# Patient Record
Sex: MALE | Race: BLACK OR AFRICAN AMERICAN | NOT HISPANIC OR LATINO | Employment: UNEMPLOYED | URBAN - METROPOLITAN AREA
[De-identification: names, ages, dates, MRNs, and addresses within clinical notes are randomized per-mention and may not be internally consistent; named-entity substitution may affect disease eponyms.]

---

## 2021-04-25 ENCOUNTER — HOSPITAL ENCOUNTER (EMERGENCY)
Age: 31
Discharge: LEFT BEFORE DISCHARGE | End: 2021-04-25
Attending: EMERGENCY MEDICINE

## 2021-04-25 ENCOUNTER — APPOINTMENT (OUTPATIENT)
Dept: CT IMAGING | Age: 31
End: 2021-04-25
Attending: EMERGENCY MEDICINE

## 2021-04-25 VITALS
RESPIRATION RATE: 25 BRPM | TEMPERATURE: 97.7 F | HEIGHT: 67 IN | OXYGEN SATURATION: 98 % | SYSTOLIC BLOOD PRESSURE: 135 MMHG | WEIGHT: 149 LBS | BODY MASS INDEX: 23.39 KG/M2 | DIASTOLIC BLOOD PRESSURE: 91 MMHG | HEART RATE: 77 BPM

## 2021-04-25 DIAGNOSIS — R10.9 ABDOMINAL PAIN, UNSPECIFIED ABDOMINAL LOCATION: Primary | ICD-10-CM

## 2021-04-25 LAB
ALBUMIN SERPL-MCNC: 3.8 G/DL (ref 3.6–5.1)
ALBUMIN/GLOB SERPL: 0.8 {RATIO} (ref 1–2.4)
ALP SERPL-CCNC: 81 UNITS/L (ref 45–117)
ALT SERPL-CCNC: 23 UNITS/L
ANION GAP SERPL CALC-SCNC: 14 MMOL/L (ref 10–20)
AST SERPL-CCNC: 19 UNITS/L
BASOPHILS # BLD: 0 K/MCL (ref 0–0.3)
BASOPHILS NFR BLD: 1 %
BILIRUB SERPL-MCNC: 0.3 MG/DL (ref 0.2–1)
BUN SERPL-MCNC: 15 MG/DL (ref 6–20)
BUN/CREAT SERPL: 14 (ref 7–25)
CALCIUM SERPL-MCNC: 8.9 MG/DL (ref 8.4–10.2)
CHLORIDE SERPL-SCNC: 101 MMOL/L (ref 98–107)
CO2 SERPL-SCNC: 25 MMOL/L (ref 21–32)
CREAT SERPL-MCNC: 1.07 MG/DL (ref 0.67–1.17)
DEPRECATED RDW RBC: 35.8 FL (ref 39–50)
EOSINOPHIL # BLD: 0.1 K/MCL (ref 0–0.5)
EOSINOPHIL NFR BLD: 1 %
ERYTHROCYTE [DISTWIDTH] IN BLOOD: 11.8 % (ref 11–15)
FASTING DURATION TIME PATIENT: ABNORMAL H
GFR SERPLBLD BASED ON 1.73 SQ M-ARVRAT: >90 ML/MIN/1.73M2
GLOBULIN SER-MCNC: 4.8 G/DL (ref 2–4)
GLUCOSE SERPL-MCNC: 83 MG/DL (ref 65–99)
HCT VFR BLD CALC: 50.5 % (ref 39–51)
HGB BLD-MCNC: 16.9 G/DL (ref 13–17)
IMM GRANULOCYTES # BLD AUTO: 0.1 K/MCL (ref 0–0.2)
IMM GRANULOCYTES # BLD: 1 %
LIPASE SERPL-CCNC: 52 UNITS/L (ref 73–393)
LYMPHOCYTES # BLD: 1.2 K/MCL (ref 1–4.8)
LYMPHOCYTES NFR BLD: 22 %
MCH RBC QN AUTO: 28.2 PG (ref 26–34)
MCHC RBC AUTO-ENTMCNC: 33.5 G/DL (ref 32–36.5)
MCV RBC AUTO: 84.3 FL (ref 78–100)
MONOCYTES # BLD: 0.7 K/MCL (ref 0.3–0.9)
MONOCYTES NFR BLD: 13 %
NEUTROPHILS # BLD: 3.5 K/MCL (ref 1.8–7.7)
NEUTROPHILS NFR BLD: 62 %
NRBC BLD MANUAL-RTO: 0 /100 WBC
PLATELET # BLD AUTO: 344 K/MCL (ref 140–450)
POTASSIUM SERPL-SCNC: 3.7 MMOL/L (ref 3.4–5.1)
PROT SERPL-MCNC: 8.6 G/DL (ref 6.4–8.2)
RBC # BLD: 5.99 MIL/MCL (ref 4.5–5.9)
SODIUM SERPL-SCNC: 136 MMOL/L (ref 135–145)
WBC # BLD: 5.6 K/MCL (ref 4.2–11)

## 2021-04-25 PROCEDURE — 10002807 HB RX 258: Performed by: EMERGENCY MEDICINE

## 2021-04-25 PROCEDURE — 10002805 HB CONTRAST AGENT: Performed by: EMERGENCY MEDICINE

## 2021-04-25 PROCEDURE — 96375 TX/PRO/DX INJ NEW DRUG ADDON: CPT

## 2021-04-25 PROCEDURE — 99285 EMERGENCY DEPT VISIT HI MDM: CPT | Performed by: EMERGENCY MEDICINE

## 2021-04-25 PROCEDURE — 10002801 HB RX 250 W/O HCPCS: Performed by: EMERGENCY MEDICINE

## 2021-04-25 PROCEDURE — 96374 THER/PROPH/DIAG INJ IV PUSH: CPT

## 2021-04-25 PROCEDURE — 96361 HYDRATE IV INFUSION ADD-ON: CPT

## 2021-04-25 PROCEDURE — 80053 COMPREHEN METABOLIC PANEL: CPT | Performed by: NURSE PRACTITIONER

## 2021-04-25 PROCEDURE — 10002800 HB RX 250 W HCPCS: Performed by: EMERGENCY MEDICINE

## 2021-04-25 PROCEDURE — 99285 EMERGENCY DEPT VISIT HI MDM: CPT | Performed by: NURSE PRACTITIONER

## 2021-04-25 PROCEDURE — 99284 EMERGENCY DEPT VISIT MOD MDM: CPT

## 2021-04-25 PROCEDURE — 85025 COMPLETE CBC W/AUTO DIFF WBC: CPT | Performed by: NURSE PRACTITIONER

## 2021-04-25 PROCEDURE — 83690 ASSAY OF LIPASE: CPT | Performed by: NURSE PRACTITIONER

## 2021-04-25 PROCEDURE — 74177 CT ABD & PELVIS W/CONTRAST: CPT

## 2021-04-25 RX ORDER — ONDANSETRON 2 MG/ML
4 INJECTION INTRAMUSCULAR; INTRAVENOUS ONCE
Status: COMPLETED | OUTPATIENT
Start: 2021-04-25 | End: 2021-04-25

## 2021-04-25 RX ORDER — FAMOTIDINE 10 MG/ML
20 INJECTION, SOLUTION INTRAVENOUS ONCE
Status: COMPLETED | OUTPATIENT
Start: 2021-04-25 | End: 2021-04-25

## 2021-04-25 RX ADMIN — Medication 15 ML: at 11:15

## 2021-04-25 RX ADMIN — SODIUM CHLORIDE 1000 ML: 9 INJECTION, SOLUTION INTRAVENOUS at 11:13

## 2021-04-25 RX ADMIN — ONDANSETRON 4 MG: 2 INJECTION INTRAMUSCULAR; INTRAVENOUS at 11:16

## 2021-04-25 RX ADMIN — DIATRIZOATE MEGLUMINE AND DIATRIZOATE SODIUM 30 ML: 660; 100 LIQUID ORAL; RECTAL at 10:55

## 2021-04-25 RX ADMIN — IOHEXOL 75 ML: 300 INJECTION, SOLUTION INTRAVENOUS at 12:34

## 2021-04-25 RX ADMIN — FAMOTIDINE 20 MG: 10 INJECTION INTRAVENOUS at 11:15

## 2021-04-25 SDOH — HEALTH STABILITY: MENTAL HEALTH: HOW OFTEN DO YOU HAVE A DRINK CONTAINING ALCOHOL?: NEVER

## 2021-04-25 ASSESSMENT — ENCOUNTER SYMPTOMS
SHORTNESS OF BREATH: 0
WHEEZING: 0
DIZZINESS: 0
DIARRHEA: 1
DIAPHORESIS: 0
EYE PAIN: 0
HEADACHES: 0
EYE REDNESS: 0
ABDOMINAL PAIN: 1
VOMITING: 0
NAUSEA: 1
FEVER: 0
EYE DISCHARGE: 0
COUGH: 0
CHILLS: 0
CONSTIPATION: 0
SORE THROAT: 0

## 2021-04-25 ASSESSMENT — PAIN DESCRIPTION - PAIN TYPE: TYPE: ACUTE PAIN

## 2021-04-25 ASSESSMENT — PAIN SCALES - GENERAL: PAINLEVEL_OUTOF10: 6

## 2023-02-08 ENCOUNTER — EMERGENCY (EMERGENCY)
Facility: HOSPITAL | Age: 33
LOS: 1 days | Discharge: HOME | End: 2023-02-08
Admitting: EMERGENCY MEDICINE
Payer: MEDICAID

## 2023-02-08 VITALS
WEIGHT: 160.06 LBS | OXYGEN SATURATION: 100 % | DIASTOLIC BLOOD PRESSURE: 82 MMHG | HEIGHT: 67 IN | HEART RATE: 103 BPM | SYSTOLIC BLOOD PRESSURE: 125 MMHG | RESPIRATION RATE: 18 BRPM | TEMPERATURE: 98 F

## 2023-02-08 DIAGNOSIS — A53.9 SYPHILIS, UNSPECIFIED: ICD-10-CM

## 2023-02-08 DIAGNOSIS — Z86.19 PERSONAL HISTORY OF OTHER INFECTIOUS AND PARASITIC DISEASES: ICD-10-CM

## 2023-02-08 PROCEDURE — 99284 EMERGENCY DEPT VISIT MOD MDM: CPT

## 2023-02-08 RX ORDER — PENICILLIN G BENZATHINE 1200000 [IU]/2ML
2.4 INJECTION, SUSPENSION INTRAMUSCULAR ONCE
Refills: 0 | Status: COMPLETED | OUTPATIENT
Start: 2023-02-08 | End: 2023-02-08

## 2023-02-08 RX ADMIN — PENICILLIN G BENZATHINE 2.4 MILLION UNIT(S): 1200000 INJECTION, SUSPENSION INTRAMUSCULAR at 20:14

## 2023-02-08 NOTE — ED PROVIDER NOTE - PHYSICAL EXAMINATION
CONSTITUTIONAL   General appearance: looks well, no acute distress, alert, interactive, pleasant, answers questions appropriately    EYES   RIGHT eye: Normal Conjunctiva and Lid   LEFT eye: Normal Conjunctiva and Lid   Sclerae: NO subconjunctival hemorrhage, No scleral icterus   PUPILS: PERRL   EOM: EOMI     EAR / NOSE / THROAT   Moist mucous membranes      NECK   Supple, no meningismus, trachea midline, full ROM     CARDIOVASCULAR   Well perfused      LUNGS   Respiratory effort: No respiratory distress, normal respiration effort, speaking in Full Sentences     ABDOMEN   No distention      SKIN   Normal color, NO erythema, NO bruising, NO skin lesion   PALPABLE RAISED LESIONS ON BILATERAL SOLES THAT ARE SKIN COLORED, SCATTERED    PSYCHIATRIC   Judgement and insight intact, normal mood and affect, patient at baseline MS     NEUROLOGIC    Alert and oriented x 3, Speech normal, No focal deficits, normal motor and sensory, MAEx4

## 2023-02-08 NOTE — ED ADULT NURSE NOTE - NSIMPLEMENTINTERV_GEN_ALL_ED
Implemented All Universal Safety Interventions:  [place] to call system. Call bell, personal items and telephone within reach. Instruct patient to call for assistance. Room bathroom lighting operational. Non-slip footwear when patient is off stretcher. Physically safe environment: no spills, clutter or unnecessary equipment. Stretcher in lowest position, wheels locked, appropriate side rails in place. Implemented All Universal Safety Interventions:  [place] to call system. Call bell, personal items and telephone within reach. Instruct patient to call for assistance. Room bathroom lighting operational. Non-slip footwear when patient is off stretcher. Physically safe environment: no spills, clutter or unnecessary equipment. Stretcher in lowest position, wheels locked, appropriate side rails in place. Implemented All Universal Safety Interventions:  [place] to call system. Call bell, personal items and telephone within reach. Instruct patient to call for assistance. Room bathroom lighting operational. Non-slip footwear when patient is off stretcher. Physically safe environment: no spills, clutter or unnecessary equipment. Stretcher in lowest position, wheels locked, appropriate side rails in place.

## 2023-02-08 NOTE — ED PROVIDER NOTE - OBJECTIVE STATEMENT
31 yo M patient here for tx of syphilis. 2 months ago, [facility], labs all neg except for elevated titers of syphilis, at this time, he was given 1 shot of IM PCN but did not return and was advised that he needed 3 weekly shots of PCN for treatment. He is here today to restart treatment.     Of note, pt states he develop raised rashes on bilateral soles of feet. No other sx. 33 yo M patient here for tx of syphilis. 2 months ago, had STI testing [facility], labs all neg except for elevated titers of syphilis, at this time, he was given 1 shot of IM PCN but did not return and was advised that he needed 3 weekly shots of PCN for treatment. He is here today to restart treatment.     Of note, pt states he develop raised rashes on bilateral soles of feet. No other sx.

## 2023-02-08 NOTE — ED PROVIDER NOTE - NSFOLLOWUPINSTRUCTIONS_ED_ALL_ED_FT
Return for the next 2 weeks for repeat penicillin shots.      Syphilis      Syphilis is an infection that can spread through sexual contact. The infection can cause serious complications, so it is important to get treatment right away.    There are four stages of syphilis:  •Primary stage. During this stage sores may form where the disease entered your body.      •Secondary stage. During this stage skin rashes and lesions will form.      •Latent stage. During this stage there are no symptoms, but the infection may still be contagious.      •Tertiary stage. This stage happens 10–30 years after the infection starts. During this stage, the disease damages organs and can lead to death. Most people do not develop this stage of syphilis.        What are the causes?    This condition is caused by bacteria called Treponema pallidum. The condition can spread during sexual activity, such as during oral, anal, or vaginal sex. It can also be spread from mother to fetus during pregnancy.      What increases the risk?    You are more likely to develop this condition if:  •You do not use a condom.      •You have or had another sexually transmitted infection (STI).      •You have multiple sex partners.      •You use illegal drugs through an IV.        What are the signs or symptoms?    Symptoms of this condition depend on the stage of the disease.    Primary stage     •Painless sores (chancres) in and around the genital organs, mouth, or hands. The sores are usually firm and round.      Secondary stage     •A rash or sores. The rash usually does not itch.      •A fever.      •A headache.      •A sore throat.      •Swollen lymph nodes.      •New sores in the mouth or on the genitals.      •A feeling of being ill.      •Pain in the joints.      •Patchy hair loss.      •Weight loss.      •Fatigue.      Latent stage     There are no symptoms during this stage.    Tertiary stage     •Dementia.      •Personality and mood changes.      •Difficulty walking and coordinating movements.      •Muscle weakness or paralysis.      •Problems with coordination.      •Heart failure.      •Trouble breathing.      •Fainting.      •Soft, rubbery growths on the skin, bones, or liver (gummas).      •Sudden "lightning" pains, numbness, or tingling.      •Vision changes.      •Hearing changes.      •Trouble controlling your urine and bowel movements.        How is this diagnosed?     This condition is diagnosed with:  •A physical exam.      •Blood tests.      •Tests of the fluid (drainage) from a sore or rash.      •Tests of the fluid around the spine (lumbar puncture). These tests are done to check for an infection in the brain or nervous system.    •Imaging tests. These may be done to check for damage to the heart, aorta, or brain if the condition is in the tertiary stage. Tests may include:  •An X-ray.      •A CT scan.      •An MRI.      •An echocardiogram. This test takes a picture of the heart.      •An ultrasound.          How is this treated?    This condition can be cured with antibiotic medicine. During the first day of treatment, the medicine may cause you to experience fever, chills, headache, nausea, or aching all over your body. This is normal and should go away within 24 hours.      Follow these instructions at home:      Medicines      •Take over-the-counter and prescription medicines only as told by your health care provider.      •Take your antibiotic medicine as told by your health care provider. Do not stop taking the antibiotic even if you start to feel better. Incomplete treatment will put you at risk for continued infection and could be life threatening.      General instructions     • Do not have sex until your treatment is completed, or as directed by your health care provider.      •Tell your recent sexual partners that you were diagnosed with syphilis. It is important that they get treatment, even if they do not have symptoms.      •Keep all follow-up visits as told by your health care provider. This is important.        How is this prevented?    •Use latex condoms correctly whenever you have sex.      •Before you have sex, ask your partner if he or she has been tested for STIs. Ask about the test results.      •Avoid having multiple sexual partners.        Contact a health care provider if:  •You continue to have any of the following symptoms 24 hours after beginning treatment:  •Fever.      •Chills.      •Headache.      •Nausea.      •Aching all over your body.        •Your symptoms do not improve, even with treatment.        Get help right away if:    •You have severe chest pain.      •You have trouble walking or coordinating movements.      •You are confused.      •You lose vision or hearing.      •You have numbness in your arms or legs.      •You have a seizure.      •You faint.      •You have a severe headache that does not go away with medicine.        Summary    •Syphilis is an infection that can spread through sexual contact or from mother to fetus during pregnancy.      •This condition can cause serious complications, so it is best to get treatment right away. The condition can be cured with antibiotic medicine.      •Take your antibiotic medicine as told by your health care provider.      •Tell your recent sexual partners that you were diagnosed with syphilis. It is important that they get treatment, even if they do not have symptoms.

## 2023-02-08 NOTE — ED ADULT NURSE NOTE - OBJECTIVE STATEMENT
Patient presents to the ED requesting penicillin injection (recently tested for syphilis and was receiving treatment). Also c/o bumps to the bottom of his feet. denies fever, chills, nausea, vomiting, dysuria, CP, SOB.

## 2023-03-22 ENCOUNTER — EMERGENCY (EMERGENCY)
Facility: HOSPITAL | Age: 33
LOS: 1 days | Discharge: HOME | End: 2023-03-22
Attending: EMERGENCY MEDICINE | Admitting: EMERGENCY MEDICINE
Payer: MEDICAID

## 2023-03-22 VITALS
SYSTOLIC BLOOD PRESSURE: 125 MMHG | TEMPERATURE: 98 F | HEIGHT: 67 IN | OXYGEN SATURATION: 94 % | WEIGHT: 149.91 LBS | HEART RATE: 92 BPM | RESPIRATION RATE: 18 BRPM | DIASTOLIC BLOOD PRESSURE: 75 MMHG

## 2023-03-22 PROCEDURE — 12001 RPR S/N/AX/GEN/TRNK 2.5CM/<: CPT | Mod: RT

## 2023-03-22 PROCEDURE — 99284 EMERGENCY DEPT VISIT MOD MDM: CPT | Mod: 25

## 2023-03-22 RX ORDER — IBUPROFEN 200 MG
600 TABLET ORAL ONCE
Refills: 0 | Status: COMPLETED | OUTPATIENT
Start: 2023-03-22 | End: 2023-03-22

## 2023-03-22 RX ORDER — TETANUS TOXOID, REDUCED DIPHTHERIA TOXOID AND ACELLULAR PERTUSSIS VACCINE, ADSORBED 5; 2.5; 8; 8; 2.5 [IU]/.5ML; [IU]/.5ML; UG/.5ML; UG/.5ML; UG/.5ML
0.5 SUSPENSION INTRAMUSCULAR ONCE
Refills: 0 | Status: COMPLETED | OUTPATIENT
Start: 2023-03-22 | End: 2023-03-22

## 2023-03-22 RX ADMIN — Medication 600 MILLIGRAM(S): at 23:28

## 2023-03-22 RX ADMIN — TETANUS TOXOID, REDUCED DIPHTHERIA TOXOID AND ACELLULAR PERTUSSIS VACCINE, ADSORBED 0.5 MILLILITER(S): 5; 2.5; 8; 8; 2.5 SUSPENSION INTRAMUSCULAR at 23:29
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
## 2023-03-23 RX ORDER — CEPHALEXIN 500 MG
1 CAPSULE ORAL
Qty: 28 | Refills: 0
Start: 2023-03-23 | End: 2023-03-29

## 2023-03-23 NOTE — ED PROVIDER NOTE - PATIENT PORTAL LINK FT
You can access the FollowMyHealth Patient Portal offered by [facility] by registering at the following website: [email or web address]. By joining [employer]’s FollowMyHealth portal, you will also be able to view your health information using other applications (apps) compatible with our system. You can access the FollowMyHealth Patient Portal offered by [facility] by registering at the following website: [email or web address]. By joining [employer]’s FollowMyHealth portal, you will also be able to view your health information using other applications (apps) compatible with our system. You can access the FollowMyHealth Patient Portal offered by [facility] by registering at the following website: [email or web address]. By joining [employer]’s FollowMyHealth portal, you will also be able to view your health information using other applications (apps) compatible with our system.
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
## 2023-03-23 NOTE — ED PROVIDER NOTE - CLINICAL SUMMARY MEDICAL DECISION MAKING FREE TEXT BOX
32-year-old male with delayed presentation of right hand wound.  Wound occurred approximately 30 hours ago.  Plan for updating tetanus, pain control, washout wound, closed with loose approximation to control bleeding, DC with outpatient follow-up.  Discussed indication for patient return to ED.  Patient understood.

## 2023-03-23 NOTE — ED PROVIDER NOTE - PHYSICAL EXAMINATION
GENERAL: well appearing, no acute distress   HEAD: atraumatic   EYES: EOMI   ENT: moist oral mucosa   CARDIAC: regular rate  RESPIRATORY: no increased work of breathing   MUSCULOSKELETAL: no deformity   NEUROLOGICAL: alert, spontaneous movement of extremities   SKIN: 2 cm wound to right hand on ulnar border over base of fifth metatarsal to subcu with scant nonpulsatile bleeding, no tendons visualized or disrupted, no foreign bodies  PSYCHIATRIC: cooperative

## 2023-03-24 DIAGNOSIS — Z23 ENCOUNTER FOR IMMUNIZATION: ICD-10-CM

## 2023-03-24 DIAGNOSIS — Z21 ASYMPTOMATIC HUMAN IMMUNODEFICIENCY VIRUS [HIV] INFECTION STATUS: ICD-10-CM
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
## 2023-03-24 DIAGNOSIS — S61.411A LACERATION WITHOUT FOREIGN BODY OF RIGHT HAND, INITIAL ENCOUNTER: ICD-10-CM
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
## 2023-09-11 ENCOUNTER — EMERGENCY (EMERGENCY)
Facility: HOSPITAL | Age: 33
LOS: 1 days | Discharge: HOME | End: 2023-09-11
Attending: EMERGENCY MEDICINE | Admitting: EMERGENCY MEDICINE
Payer: MEDICAID

## 2023-09-11 VITALS
WEIGHT: 169.98 LBS | OXYGEN SATURATION: 97 % | DIASTOLIC BLOOD PRESSURE: 78 MMHG | RESPIRATION RATE: 16 BRPM | HEART RATE: 77 BPM | SYSTOLIC BLOOD PRESSURE: 124 MMHG | TEMPERATURE: 98 F | HEIGHT: 67 IN

## 2023-09-11 PROCEDURE — 99283 EMERGENCY DEPT VISIT LOW MDM: CPT

## 2023-09-11 RX ORDER — CEFUROXIME AXETIL 250 MG
500 TABLET ORAL ONCE
Refills: 0 | Status: COMPLETED | OUTPATIENT
Start: 2023-09-11 | End: 2023-09-11

## 2023-09-11 RX ORDER — CEFUROXIME AXETIL 250 MG
1 TABLET ORAL
Qty: 14 | Refills: 0
Start: 2023-09-11 | End: 2023-09-17

## 2023-09-11 RX ORDER — IBUPROFEN 200 MG
600 TABLET ORAL ONCE
Refills: 0 | Status: COMPLETED | OUTPATIENT
Start: 2023-09-11 | End: 2023-09-11

## 2023-09-11 RX ADMIN — Medication 600 MILLIGRAM(S): at 05:11

## 2023-09-11 RX ADMIN — Medication 500 MILLIGRAM(S): at 05:11

## 2023-09-11 RX ADMIN — Medication 100 MILLIGRAM(S): at 05:11

## 2023-09-11 NOTE — ED PROVIDER NOTE - NSFOLLOWUPINSTRUCTIONS_ED_ALL_ED_FT
Please read all handouts provided to you from the emergency department. Seek immediate medical attention for any new/worsening signs or symptoms.  Take any prescribed medications as directed. Please follow up with  your primary physician in the next 3-5 days.    CELLULITIS    Cellulitis is an infection of the skin and soft tissue of the skin. The infection is usually caused by bacteria, such as staphylococci ("Staph") or streptococci ("Strep"); these commonly live on the skin or inner surface of the nose or mouth among healthy people. For many people, these skin bacteria never cause infection. However, cellulitis can develop if there is a break in the skin, such as a wound or athlete's foot, which may be minor or even unnoticed. Such breaks in the skin allow bacteria to enter and grow, causing infection.    Many cases of cellulitis are mild and the involved skin clears completely with antibiotic treatment. However, some cases of cellulitis can be severe and lead to generalized infection. Thus, it is important to seek medical care promptly if the infection is associated with fever, rapid worsening of skin changes, other signs of progression, or if you have other medical problems, such as diabetes.      Cellulitis treatment includes antibiotics as well as treatment of any underlying condition that led to the skin infection.    Elevate the area — Elevating the arm or leg above the level of the heart can help to reduce swelling and speed healing.    Keep the area clean and dry — It is important to keep the infected area clean and dry. You can shower or bathe normally and pat the area dry with a clean towel. You can use a bandage or gauze to protect the skin if needed. Do not use any antibiotic ointments or creams.    Antibiotics — Most people with cellulitis are treated with an antibiotic that is taken by mouth for 5 to 14 days. The "best" antibiotic depends upon an evaluation by your health care provider as to the likely bacterial cause of cellulitis in your case.    If the infection is severe, you may need to be hospitalized and treated with antibiotics given through a vein (intravenously, or IV).    It is important to take the antibiotic exactly as recommended and to finish the entire course of treatment. Skipping doses or ending treatment early could potentially allow the bacteria to become resistant and require longer treatment or permit the infection to worsen after initial improvement.    Pain Control — You may take ibuprofen (e.g. Advil, Motrin) 600 mg every 6 hours and/or acetaminophen (e.g. Tylenol) 650 every 4-6 hours as needed for pain.    Time to heal — Resolution of fever and chills, if they were initially present, should occur within one to two days after starting antibiotic therapy. Local findings of swelling, warmth, and redness should begin to improve within one to three days after starting antibiotics, although these symptoms can persist for two weeks. If the reddened area becomes larger, more swollen, or more tender, call your health care provider. He or she may want to re-examine you to determine if further testing or an alternate antibiotic is needed.    Transmission to others — In general, cellulitis is not highly contagious to other family members. However, when you have cellulitis, it is important to wash your hands regularly with soap and water and to avoid sharing towels.      You should call your doctor or nurse if your symptoms do not get better within 3 days of starting treatment. You should also call if the affected area gets:  - Bigger  - More swollen  - More painful      Follow up with your Primary Care Physician in 5-7 days as needed to check status of infection.     You can also follow up with clinics listed below if you do not have a doctor:  [address] [address]  Appointment Center:  [phone]    [address] [address]  Appointment Center: [phone] ([phone]) Please read all handouts provided to you from the emergency department. Seek immediate medical attention for any new/worsening signs or symptoms.  Take any prescribed medications as directed. Please follow up with  your primary physician in the next 3-5 days.    CELLULITIS    Cellulitis is an infection of the skin and soft tissue of the skin. The infection is usually caused by bacteria, such as staphylococci ("Staph") or streptococci ("Strep"); these commonly live on the skin or inner surface of the nose or mouth among healthy people. For many people, these skin bacteria never cause infection. However, cellulitis can develop if there is a break in the skin, such as a wound or athlete's foot, which may be minor or even unnoticed. Such breaks in the skin allow bacteria to enter and grow, causing infection.    Many cases of cellulitis are mild and the involved skin clears completely with antibiotic treatment. However, some cases of cellulitis can be severe and lead to generalized infection. Thus, it is important to seek medical care promptly if the infection is associated with fever, rapid worsening of skin changes, other signs of progression, or if you have other medical problems, such as diabetes.      Cellulitis treatment includes antibiotics as well as treatment of any underlying condition that led to the skin infection.    Elevate the area — Elevating the arm or leg above the level of the heart can help to reduce swelling and speed healing.    Keep the area clean and dry — It is important to keep the infected area clean and dry. You can shower or bathe normally and pat the area dry with a clean towel. You can use a bandage or gauze to protect the skin if needed. Do not use any antibiotic ointments or creams.    Antibiotics — Most people with cellulitis are treated with an antibiotic that is taken by mouth for 5 to 14 days. The "best" antibiotic depends upon an evaluation by your health care provider as to the likely bacterial cause of cellulitis in your case.    If the infection is severe, you may need to be hospitalized and treated with antibiotics given through a vein (intravenously, or IV).    It is important to take the antibiotic exactly as recommended and to finish the entire course of treatment. Skipping doses or ending treatment early could potentially allow the bacteria to become resistant and require longer treatment or permit the infection to worsen after initial improvement.    Pain Control — You may take ibuprofen (e.g. Advil, Motrin) 600 mg every 6 hours and/or acetaminophen (e.g. Tylenol) 650 every 4-6 hours as needed for pain.    Time to heal — Resolution of fever and chills, if they were initially present, should occur within one to two days after starting antibiotic therapy. Local findings of swelling, warmth, and redness should begin to improve within one to three days after starting antibiotics, although these symptoms can persist for two weeks. If the reddened area becomes larger, more swollen, or more tender, call your health care provider. He or she may want to re-examine you to determine if further testing or an alternate antibiotic is needed.    Transmission to others — In general, cellulitis is not highly contagious to other family members. However, when you have cellulitis, it is important to wash your hands regularly with soap and water and to avoid sharing towels.      You should call your doctor or nurse if your symptoms do not get better within 3 days of starting treatment. You should also call if the affected area gets:  - Bigger  - More swollen  - More painful      Follow up with your Primary Care Physician in 5-7 days as needed to check status of infection.     You can also follow up with clinics listed below if you do not have a doctor:  [address] [address]  Appointment Center:  [phone]    [address] [address]  Appointment Center: [phone] ([phone]) Please read all handouts provided to you from the emergency department. Seek immediate medical attention for any new/worsening signs or symptoms.  Take any prescribed medications as directed. Please follow up with  your primary physician in the next 3-5 days.    CELLULITIS    Cellulitis is an infection of the skin and soft tissue of the skin. The infection is usually caused by bacteria, such as staphylococci ("Staph") or streptococci ("Strep"); these commonly live on the skin or inner surface of the nose or mouth among healthy people. For many people, these skin bacteria never cause infection. However, cellulitis can develop if there is a break in the skin, such as a wound or athlete's foot, which may be minor or even unnoticed. Such breaks in the skin allow bacteria to enter and grow, causing infection.    Many cases of cellulitis are mild and the involved skin clears completely with antibiotic treatment. However, some cases of cellulitis can be severe and lead to generalized infection. Thus, it is important to seek medical care promptly if the infection is associated with fever, rapid worsening of skin changes, other signs of progression, or if you have other medical problems, such as diabetes.      Cellulitis treatment includes antibiotics as well as treatment of any underlying condition that led to the skin infection.    Elevate the area — Elevating the arm or leg above the level of the heart can help to reduce swelling and speed healing.    Keep the area clean and dry — It is important to keep the infected area clean and dry. You can shower or bathe normally and pat the area dry with a clean towel. You can use a bandage or gauze to protect the skin if needed. Do not use any antibiotic ointments or creams.    Antibiotics — Most people with cellulitis are treated with an antibiotic that is taken by mouth for 5 to 14 days. The "best" antibiotic depends upon an evaluation by your health care provider as to the likely bacterial cause of cellulitis in your case.    If the infection is severe, you may need to be hospitalized and treated with antibiotics given through a vein (intravenously, or IV).    It is important to take the antibiotic exactly as recommended and to finish the entire course of treatment. Skipping doses or ending treatment early could potentially allow the bacteria to become resistant and require longer treatment or permit the infection to worsen after initial improvement.    Pain Control — You may take ibuprofen (e.g. Advil, Motrin) 600 mg every 6 hours and/or acetaminophen (e.g. Tylenol) 650 every 4-6 hours as needed for pain.    Time to heal — Resolution of fever and chills, if they were initially present, should occur within one to two days after starting antibiotic therapy. Local findings of swelling, warmth, and redness should begin to improve within one to three days after starting antibiotics, although these symptoms can persist for two weeks. If the reddened area becomes larger, more swollen, or more tender, call your health care provider. He or she may want to re-examine you to determine if further testing or an alternate antibiotic is needed.    Transmission to others — In general, cellulitis is not highly contagious to other family members. However, when you have cellulitis, it is important to wash your hands regularly with soap and water and to avoid sharing towels.      You should call your doctor or nurse if your symptoms do not get better within 3 days of starting treatment. You should also call if the affected area gets:  - Bigger  - More swollen  - More painful      Follow up with your Primary Care Physician in 5-7 days as needed to check status of infection.     You can also follow up with clinics listed below if you do not have a doctor:  [address] [address]  Appointment Center:  [phone]    [address] [address]  Appointment Center: [phone] ([phone])

## 2023-09-11 NOTE — ED ADULT NURSE NOTE - NSFALLUNIVINTERV_ED_ALL_ED
Bed/Stretcher in lowest position, wheels locked, appropriate side rails in place/Call bell, personal items and telephone in reach/Instruct patient to call for assistance before getting out of bed/chair/stretcher/Non-slip footwear applied when patient is off stretcher/[place] to call system/Physically safe environment - no spills, clutter or unnecessary equipment/Purposeful proactive rounding/Room/bathroom lighting operational, light cord in reach Bed/Stretcher in lowest position, wheels locked, appropriate side rails in place/Call bell, personal items and telephone in reach/Instruct patient to call for assistance before getting out of bed/chair/stretcher/Non-slip footwear applied when patient is off stretcher/[place] to call system/Physically safe environment - no spills, clutter or unnecessary equipment/Purposeful proactive rounding/Room/bathroom lighting operational, light cord in reach Bed/Stretcher in lowest position, wheels locked, appropriate side rails in place/Call bell, personal items and telephone in reach/Instruct patient to call for assistance before getting out of bed/chair/stretcher/Non-slip footwear applied when patient is off stretcher/[place] to call system/Physically safe environment - no spills, clutter or unnecessary equipment/Purposeful proactive rounding/Room/bathroom lighting operational, light cord in reach

## 2023-09-11 NOTE — ED PROVIDER NOTE - CLINICAL SUMMARY MEDICAL DECISION MAKING FREE TEXT BOX
32M history of HIV  1d of R gluteal cleft pain/swelling described as severe, burning, constant, non-radiating. Patient went to be without symptoms and woke up bc of the pain. Has never had an abscess in the past. Denies fevers/chills, nausea/vomiting, vision changes, headache, chest pain, shortness of breath, abdominal pain, bowel/bladder changes.     PE: A&Ox3, well appearing, NAD, NCAT, regular respiratory effort, moving all four extremities equally.   Skin: R superior gluteal cleft with indurated tender area about 2x2cm with small pustular head; bedside sono shows non-drainable collection    MDM: Patient with s/s consistent with gluteal cleft cellulitis that is non-drainable. Will treat with oral abx as outpatient.

## 2023-09-11 NOTE — ED PROVIDER NOTE - OBJECTIVE STATEMENT
32M history of HIV  1d of R gluteal cleft pain/swelling described as severe, burning, constant, non-radiating. Patient went to be without symptoms and woke up bc of the pain. Has never had an abscess in the past. Denies fevers/chills, nausea/vomiting, vision changes, headache, chest pain, shortness of breath, abdominal pain, bowel/bladder changes.

## 2023-09-11 NOTE — ED PROVIDER NOTE - WET READ LAUNCH FT
1/10/2018    Vilma Jang  [address] [facility] [address]    Dear Vilma Jang    The results from your recent test(s) are enclosed.      Resulted Orders   CHLAMYDIA/GC BY NUCLEIC ACID AMPLIFICATION   Result Value Ref Range    SOURCE CERVICAL     Chlamydia Trachomatis by Nucleic Acid Amplification NEGATIVE NEGATIVE    Neisseria Gonorrhoeae by Nucleic Acid Amplification NEGATIVE NEGATIVE              Sincerely,        VIKKI Ford  [address] [address] [phone](P)  [phone](F)     There are no Wet Read(s) to document.

## 2023-09-11 NOTE — ED PROVIDER NOTE - PATIENT PORTAL LINK FT
You can access the FollowMyHealth Patient Portal offered by [facility] by registering at the following website: [email or web address]. By joining [employer]’s FollowMyHealth portal, you will also be able to view your health information using other applications (apps) compatible with our system. You can access the FollowMyHealth Patient Portal offered by [facility] by registering at the following website: [email or web address]. By joining [employer]’s FollowMyHealth portal, you will also be able to view your health information using other applications (apps) compatible with our system. You can access the FollowMyHealth Patient Portal offered by [facility] by registering at the following website: [email or web address]. By joining [employer]’s FollowMyHealth portal, you will also be able to view your health information using other applications (apps) compatible with our system.

## 2023-09-11 NOTE — ED PROVIDER NOTE - PHYSICAL EXAMINATION
PE: A&Ox3, well appearing, NAD, NCAT, regular respiratory effort, moving all four extremities equally.   Skin: R superior gluteal cleft with indurated tender area about 2x2cm with small pustular head; bedside sono shows non-drainable collection

## 2023-09-14 DIAGNOSIS — Z21 ASYMPTOMATIC HUMAN IMMUNODEFICIENCY VIRUS [HIV] INFECTION STATUS: ICD-10-CM

## 2023-09-14 DIAGNOSIS — F17.200 NICOTINE DEPENDENCE, UNSPECIFIED, UNCOMPLICATED: ICD-10-CM

## 2023-09-14 DIAGNOSIS — M54.50 LOW BACK PAIN, UNSPECIFIED: ICD-10-CM

## 2023-09-14 DIAGNOSIS — L03.317 CELLULITIS OF BUTTOCK: ICD-10-CM

## 2023-12-19 ENCOUNTER — EMERGENCY (EMERGENCY)
Facility: HOSPITAL | Age: 33
LOS: 1 days | Discharge: HOME | End: 2023-12-19
Admitting: EMERGENCY MEDICINE
Payer: MEDICAID

## 2023-12-19 VITALS
RESPIRATION RATE: 16 BRPM | OXYGEN SATURATION: 95 % | DIASTOLIC BLOOD PRESSURE: 78 MMHG | TEMPERATURE: 98 F | SYSTOLIC BLOOD PRESSURE: 127 MMHG | HEART RATE: 95 BPM

## 2023-12-19 VITALS
SYSTOLIC BLOOD PRESSURE: 123 MMHG | DIASTOLIC BLOOD PRESSURE: 70 MMHG | RESPIRATION RATE: 16 BRPM | OXYGEN SATURATION: 96 % | HEART RATE: 90 BPM | TEMPERATURE: 98 F

## 2023-12-19 DIAGNOSIS — R07.89 OTHER CHEST PAIN: ICD-10-CM

## 2023-12-19 PROCEDURE — 99284 EMERGENCY DEPT VISIT MOD MDM: CPT

## 2023-12-19 PROCEDURE — 71046 X-RAY EXAM CHEST 2 VIEWS: CPT | Mod: 26

## 2023-12-19 RX ORDER — IBUPROFEN 200 MG
600 TABLET ORAL ONCE
Refills: 0 | Status: COMPLETED | OUTPATIENT
Start: 2023-12-19 | End: 2023-12-19

## 2023-12-19 RX ADMIN — Medication 600 MILLIGRAM(S): at 04:27

## 2023-12-19 NOTE — ED PROVIDER NOTE - CLINICAL SUMMARY MEDICAL DECISION MAKING FREE TEXT BOX
well appearing pt here with L sided chest pain worse with inspiration no PE risk factors, and negative PERC criteria, pt is neurovascular intact, will obtain cxr to r/o pneumonia and ptx, and ecg for acute acs if negative follow up with PMD

## 2023-12-19 NOTE — ED POST DISCHARGE NOTE - ADDITIONAL DOCUMENTATION
Notified by radiology of perihilar mass advised CT chest. Will attempt to contact patient and send letter.

## 2023-12-19 NOTE — ED ADULT NURSE NOTE - NSFALLUNIVINTERV_ED_ALL_ED
Bed/Stretcher in lowest position, wheels locked, appropriate side rails in place/Call bell, personal items and telephone in reach/Instruct patient to call for assistance before getting out of bed/chair/stretcher/Non-slip footwear applied when patient is off stretcher/[place] to call system/Physically safe environment - no spills, clutter or unnecessary equipment/Purposeful proactive rounding/Room/bathroom lighting operational, light cord in reach Bed/Stretcher in lowest position, wheels locked, appropriate side rails in place/Call bell, personal items and telephone in reach/Instruct patient to call for assistance before getting out of bed/chair/stretcher/Non-slip footwear applied when patient is off stretcher/[place] to call system/Physically safe environment - no spills, clutter or unnecessary equipment/Purposeful proactive rounding/Room/bathroom lighting operational, light cord in reach

## 2023-12-19 NOTE — ED ADULT NURSE NOTE - OBJECTIVE STATEMENT
pt presents to ed reporting left sided rib pain since yesterday, woke up and it started. denies falls/injuries. also endorsing difficulty taking a full breath, endorses sharp pain when he does so

## 2023-12-19 NOTE — ED PROVIDER NOTE - PATIENT PORTAL LINK FT
You can access the FollowMyHealth Patient Portal offered by [facility] by registering at the following website: [email or web address]. By joining [employer]’s FollowMyHealth portal, you will also be able to view your health information using other applications (apps) compatible with our system. You can access the FollowMyHealth Patient Portal offered by [facility] by registering at the following website: [email or web address]. By joining [employer]’s FollowMyHealth portal, you will also be able to view your health information using other applications (apps) compatible with our system.

## 2023-12-19 NOTE — ED PROVIDER NOTE - OBJECTIVE STATEMENT
33 yo m no sig pmhx here with L sided sharp chest pain worse with deep inspiration and improves with rest the cp is non exertional ongoing for 1 d in duration, no n/v or diaphoresis. No unilateral swelling, hemoptysis, estogen supplementation, malignancy, recent immobilization or surgery, or prior DVT/PE. Admits to cocaine use last use was 3 d ago.    I have reviewed available current nursing and previous documentation of past medical, surgical, family, and/or social history. 31 yo m no sig pmhx here with L sided sharp chest pain worse with deep inspiration and improves with rest the cp is non exertional ongoing for 1 d in duration, no n/v or diaphoresis. No unilateral swelling, hemoptysis, estogen supplementation, malignancy, recent immobilization or surgery, or prior DVT/PE. Admits to cocaine use last use was 3 d ago.    I have reviewed available current nursing and previous documentation of past medical, surgical, family, and/or social history.

## 2024-01-11 ENCOUNTER — EMERGENCY (EMERGENCY)
Facility: HOSPITAL | Age: 34
LOS: 1 days | Discharge: HOME | End: 2024-01-11
Attending: EMERGENCY MEDICINE | Admitting: EMERGENCY MEDICINE
Payer: MEDICAID

## 2024-01-11 VITALS
HEIGHT: 67 IN | HEART RATE: 61 BPM | RESPIRATION RATE: 18 BRPM | SYSTOLIC BLOOD PRESSURE: 119 MMHG | WEIGHT: 169.98 LBS | TEMPERATURE: 99 F | OXYGEN SATURATION: 95 % | DIASTOLIC BLOOD PRESSURE: 61 MMHG

## 2024-01-11 DIAGNOSIS — Z20.822 CONTACT WITH AND (SUSPECTED) EXPOSURE TO COVID-19: ICD-10-CM

## 2024-01-11 DIAGNOSIS — R36.9 URETHRAL DISCHARGE, UNSPECIFIED: ICD-10-CM

## 2024-01-11 DIAGNOSIS — R05.9 COUGH, UNSPECIFIED: ICD-10-CM

## 2024-01-11 DIAGNOSIS — Z20.2 CONTACT WITH AND (SUSPECTED) EXPOSURE TO INFECTIONS WITH A PREDOMINANTLY SEXUAL MODE OF TRANSMISSION: ICD-10-CM

## 2024-01-11 DIAGNOSIS — Z86.19 PERSONAL HISTORY OF OTHER INFECTIOUS AND PARASITIC DISEASES: ICD-10-CM

## 2024-01-11 DIAGNOSIS — J11.1 INFLUENZA DUE TO UNIDENTIFIED INFLUENZA VIRUS WITH OTHER RESPIRATORY MANIFESTATIONS: ICD-10-CM

## 2024-01-11 DIAGNOSIS — Z21 ASYMPTOMATIC HUMAN IMMUNODEFICIENCY VIRUS [HIV] INFECTION STATUS: ICD-10-CM

## 2024-01-11 PROCEDURE — 99284 EMERGENCY DEPT VISIT MOD MDM: CPT

## 2024-01-11 NOTE — ED PROVIDER NOTE - CLINICAL SUMMARY MEDICAL DECISION MAKING FREE TEXT BOX
33yoM no PMHx p/w cough and 1 week of burning with urination associated with abnormal penile discharge. Pt reports possible syphilis exposure 1mo ago, given exposure and 1w of symptoms he presented to ED for evaluation. Has Hx of gonorrhea treated 3y ago otherwise no medical problems. Pt also reports worsening cough, no sick contacts. Denies fever/chills, HA, SOB, CP, abd pain, nausea/vomiting, diarrhea/constipation, bloody discharge, hematuria/hematochezia/melena, ulcers/rashes/lesions, numbness/tingling, focal weakness.    General: WN/WD NAD  Head: Atraumatic  Eyes: EOM grossly in tact, no scleral icterus  ENT: moist mucous membranes  Neurology: A&Ox3, nonfocal, ANDINO x 4  Respiratory: normal respiratory effort, CTAB b/l  CV: Extremities warm and well perfused, RRR  Abdominal: Soft, non-distended, non-tender  Extremities: No edema  Skin: No rashes    Concern for viral URI given season, no focal findings to suggest pna; will also screen for STIs given concern for exposure and treat empirically. Eval w/CXR, viral swab, UA, UCx, gc/chlam urine, RPR, empiric STI coverage w/ceftriaxone, azithromycin, and penicillin, and DCTH w/PCP follow up. Of note, pt had perihilar mass 1mo ago on CXR, ED unable to reach pt for follow up. Pt updated regarding abnormal CXR findings from 1mo ago. This represents an initial assessment; workup and plan subject to change. - Jennifer Kahn, PGY-3

## 2024-01-11 NOTE — ED PROVIDER NOTE - CARE PROVIDERS DIRECT ADDRESSES
[email or web address]

## 2024-01-11 NOTE — ED ADULT TRIAGE NOTE - CHIEF COMPLAINT QUOTE
Pt walked into ER c/o cough and STD check (penile dc x1 week. Pt denies further at triage. -PMH/NKDA.

## 2024-01-11 NOTE — ED PROVIDER NOTE - WR INTERPRETATION 1
CXR negative - No infiltrates, No consolidation, No atelectasis seen, Previously seen hilar mass improved resolved

## 2024-01-11 NOTE — ED PROVIDER NOTE - CARE PROVIDER_API CALL
Huma Sharpe  Internal Medicine  [address] [place] [address]  Phone: [phone]  Fax: [phone]  Follow Up Time:    Huma Sharpe  Internal Medicine  [address] [place] [id number]  Phone: [phone]  Fax: [phone]  Follow Up Time:

## 2024-01-11 NOTE — ED PROVIDER NOTE - PATIENT PORTAL LINK FT
You can access the FollowMyHealth Patient Portal offered by [facility] by registering at the following website: [email or web address]. By joining [employer]’s FollowMyHealth portal, you will also be able to view your health information using other applications (apps) compatible with our system. You can access the FollowMyHealth Patient Portal offered by [facility] by registering at the following website: [email or web address]. By joining [employer]’s FollowMyHealth portal, you will also be able to view your health information using other applications (apps) compatible with our system.

## 2024-01-11 NOTE — ED PROVIDER NOTE - ATTENDING CONTRIBUTION TO CARE
I have discussed the case with the resident/mid level provider. I have personally performed a history, physical exam, and my own medical decision making. I have reviewed the note and agree with the findings and plan     Patient with past medical history of HIV compliant with Biktarvy, who presents with concern for STD exposure.  He is MSM, and is complaining of penile discharge with mild discomfort with urination as well as an itchy rash to the anal area he denies burning pain denies fever chills pelvic pain patient has been treated 9 months ago for gonorrhea otherwise has no other associated symptoms patient also complains of a cough which started recently he is a poor historian not vaccinated for flu and not fully vaccinated for COVID    Patient is well-appearing on the last visit here he had a chest x-ray with hilar mass suspected.  Repeat chest x-ray here does not show evidence of hilar mass.  Patient is positive for influenza and will also be treated empirically for STI.  Patient given referral for primary care agrees with plan counseled on smoking cessation. Patient to be treated empirically for STI and tested as well.  Symptomatic relief for flulike symptoms and cough at this time appears well and his vital signs are stable

## 2024-01-12 VITALS
DIASTOLIC BLOOD PRESSURE: 75 MMHG | RESPIRATION RATE: 18 BRPM | TEMPERATURE: 98 F | SYSTOLIC BLOOD PRESSURE: 117 MMHG | HEART RATE: 73 BPM | OXYGEN SATURATION: 97 %

## 2024-01-12 LAB
APPEARANCE UR: CLEAR — SIGNIFICANT CHANGE UP
APPEARANCE UR: CLEAR — SIGNIFICANT CHANGE UP
BILIRUB UR-MCNC: NEGATIVE — SIGNIFICANT CHANGE UP
BILIRUB UR-MCNC: NEGATIVE — SIGNIFICANT CHANGE UP
C TRACH RRNA SPEC QL NAA+PROBE: SIGNIFICANT CHANGE UP
COLOR SPEC: SIGNIFICANT CHANGE UP
COLOR SPEC: SIGNIFICANT CHANGE UP
DIFF PNL FLD: ABNORMAL
DIFF PNL FLD: ABNORMAL
FLUAV AG NPH QL: DETECTED
FLUAV AG NPH QL: DETECTED
FLUBV AG NPH QL: SIGNIFICANT CHANGE UP
FLUBV AG NPH QL: SIGNIFICANT CHANGE UP
GLUCOSE UR QL: NEGATIVE MG/DL — SIGNIFICANT CHANGE UP
GLUCOSE UR QL: NEGATIVE MG/DL — SIGNIFICANT CHANGE UP
KETONES UR-MCNC: NEGATIVE MG/DL — SIGNIFICANT CHANGE UP
KETONES UR-MCNC: NEGATIVE MG/DL — SIGNIFICANT CHANGE UP
LEUKOCYTE ESTERASE UR-ACNC: NEGATIVE — SIGNIFICANT CHANGE UP
LEUKOCYTE ESTERASE UR-ACNC: NEGATIVE — SIGNIFICANT CHANGE UP
N GONORRHOEA RRNA SPEC QL NAA+PROBE: SIGNIFICANT CHANGE UP
NITRITE UR-MCNC: NEGATIVE — SIGNIFICANT CHANGE UP
NITRITE UR-MCNC: NEGATIVE — SIGNIFICANT CHANGE UP
PH UR: 6 — SIGNIFICANT CHANGE UP (ref 5–8)
PH UR: 6 — SIGNIFICANT CHANGE UP (ref 5–8)
PROT UR-MCNC: 30 MG/DL
PROT UR-MCNC: 30 MG/DL
RSV RNA NPH QL NAA+NON-PROBE: SIGNIFICANT CHANGE UP
RSV RNA NPH QL NAA+NON-PROBE: SIGNIFICANT CHANGE UP
SARS-COV-2 RNA SPEC QL NAA+PROBE: SIGNIFICANT CHANGE UP
SARS-COV-2 RNA SPEC QL NAA+PROBE: SIGNIFICANT CHANGE UP
SP GR SPEC: 1.03 — HIGH (ref 1–1.03)
SP GR SPEC: 1.03 — HIGH (ref 1–1.03)
SPECIMEN SOURCE: SIGNIFICANT CHANGE UP
SPECIMEN SOURCE: SIGNIFICANT CHANGE UP
UROBILINOGEN FLD QL: 1 MG/DL — SIGNIFICANT CHANGE UP (ref 0.2–1)
UROBILINOGEN FLD QL: 1 MG/DL — SIGNIFICANT CHANGE UP (ref 0.2–1)

## 2024-01-12 PROCEDURE — 71046 X-RAY EXAM CHEST 2 VIEWS: CPT | Mod: 26

## 2024-01-12 RX ORDER — PENICILLIN G BENZATHINE 1200000 [IU]/2ML
2.4 INJECTION, SUSPENSION INTRAMUSCULAR ONCE
Refills: 0 | Status: COMPLETED | OUTPATIENT
Start: 2024-01-12 | End: 2024-01-12

## 2024-01-12 RX ORDER — CEFTRIAXONE 500 MG/1
500 INJECTION, POWDER, FOR SOLUTION INTRAMUSCULAR; INTRAVENOUS ONCE
Refills: 0 | Status: COMPLETED | OUTPATIENT
Start: 2024-01-12 | End: 2024-01-12

## 2024-01-12 RX ORDER — AZITHROMYCIN 500 MG/1
1000 TABLET, FILM COATED ORAL ONCE
Refills: 0 | Status: COMPLETED | OUTPATIENT
Start: 2024-01-12 | End: 2024-01-12

## 2024-01-12 RX ADMIN — AZITHROMYCIN 1000 MILLIGRAM(S): 500 TABLET, FILM COATED ORAL at 01:29

## 2024-01-12 RX ADMIN — CEFTRIAXONE 500 MILLIGRAM(S): 500 INJECTION, POWDER, FOR SOLUTION INTRAMUSCULAR; INTRAVENOUS at 01:29

## 2024-01-12 RX ADMIN — PENICILLIN G BENZATHINE 2.4 MILLION UNIT(S): 1200000 INJECTION, SUSPENSION INTRAMUSCULAR at 01:32

## 2024-01-12 NOTE — ED ADULT NURSE NOTE - FINAL NURSING ELECTRONIC SIGNATURE
Problem: Patient Care Overview (Adult)  Goal: Plan of Care Review  Outcome: Ongoing (interventions implemented as appropriate)    11/18/17 0913   Coping/Psychosocial Response Interventions   Plan Of Care Reviewed With patient;spouse   Patient Care Overview   Progress no change   Outcome Evaluation   Outcome Summary/Follow up Plan Co-visited with Hospice RN. Wife interested in information about home Palliative and Primary Care, home Hospice services. Will provide written information and follow up with pt and family tomorrow.            12-Jan-2024 02:15

## 2024-01-12 NOTE — ED ADULT NURSE NOTE - NSFALLUNIVINTERV_ED_ALL_ED
Bed/Stretcher in lowest position, wheels locked, appropriate side rails in place/Call bell, personal items and telephone in reach/Instruct patient to call for assistance before getting out of bed/chair/stretcher/Non-slip footwear applied when patient is off stretcher/[place] to call system/Physically safe environment - no spills, clutter or unnecessary equipment/Purposeful proactive rounding/Room/bathroom lighting operational, light cord in reach Bed/Stretcher in lowest position, wheels locked, appropriate side rails in place/Call bell, personal items and telephone in reach/Instruct patient to call for assistance before getting out of bed/chair/stretcher/Non-slip footwear applied when patient is off stretcher/[place] to call system/Physically safe environment - no spills, clutter or unnecessary equipment/Purposeful proactive rounding/Room/bathroom lighting operational, light cord in reach

## 2024-01-12 NOTE — ED ADULT NURSE NOTE - NSSEPSISSUSPECTED_ED_A_ED
Dr. Moy rounds

assessed patient at bedside, will follow up with any new orders. 

-------------------------------------------------------------------------------

Addendum: 12/05/18 at 1856 by Kwaku Montes RN

-------------------------------------------------------------------------------

WRONG PATIENT * No

## 2024-01-14 LAB
CULTURE RESULTS: SIGNIFICANT CHANGE UP
CULTURE RESULTS: SIGNIFICANT CHANGE UP
SPECIMEN SOURCE: SIGNIFICANT CHANGE UP
SPECIMEN SOURCE: SIGNIFICANT CHANGE UP

## 2024-01-16 LAB — T PALLIDUM AB TITR SER: POSITIVE

## 2024-01-30 ENCOUNTER — EMERGENCY (EMERGENCY)
Facility: HOSPITAL | Age: 34
LOS: 1 days | Discharge: LEFT BEFORE DISCHARGE | End: 2024-01-30
Attending: EMERGENCY MEDICINE | Admitting: EMERGENCY MEDICINE
Payer: MEDICAID

## 2024-01-30 VITALS
SYSTOLIC BLOOD PRESSURE: 123 MMHG | RESPIRATION RATE: 19 BRPM | HEIGHT: 67 IN | DIASTOLIC BLOOD PRESSURE: 73 MMHG | TEMPERATURE: 98 F | WEIGHT: 179.9 LBS | HEART RATE: 88 BPM | OXYGEN SATURATION: 97 %

## 2024-01-30 DIAGNOSIS — R30.0 DYSURIA: ICD-10-CM

## 2024-01-30 DIAGNOSIS — Z21 ASYMPTOMATIC HUMAN IMMUNODEFICIENCY VIRUS [HIV] INFECTION STATUS: ICD-10-CM

## 2024-01-30 PROCEDURE — 99284 EMERGENCY DEPT VISIT MOD MDM: CPT

## 2024-01-30 RX ORDER — CEFTRIAXONE 500 MG/1
500 INJECTION, POWDER, FOR SOLUTION INTRAMUSCULAR; INTRAVENOUS ONCE
Refills: 0 | Status: COMPLETED | OUTPATIENT
Start: 2024-01-30 | End: 2024-01-30

## 2024-01-30 RX ORDER — PENICILLIN G BENZATHINE 1200000 [IU]/2ML
2.4 INJECTION, SUSPENSION INTRAMUSCULAR ONCE
Refills: 0 | Status: COMPLETED | OUTPATIENT
Start: 2024-01-30 | End: 2024-01-30

## 2024-01-30 RX ADMIN — PENICILLIN G BENZATHINE 2.4 MILLION UNIT(S): 1200000 INJECTION, SUSPENSION INTRAMUSCULAR at 02:51

## 2024-01-30 RX ADMIN — Medication 100 MILLIGRAM(S): at 02:51

## 2024-01-30 RX ADMIN — CEFTRIAXONE 500 MILLIGRAM(S): 500 INJECTION, POWDER, FOR SOLUTION INTRAMUSCULAR; INTRAVENOUS at 02:51

## 2024-01-30 NOTE — ED ADULT NURSE NOTE - URINE COLOR
yellow Benzoyl Peroxide Pregnancy And Lactation Text: This medication is Pregnancy Category C. It is unknown if benzoyl peroxide is excreted in breast milk. Doxycycline Counseling:  Patient counseled regarding possible photosensitivity and increased risk for sunburn.  Patient instructed to avoid sunlight, if possible.  When exposed to sunlight, patients should wear protective clothing, sunglasses, and sunscreen.  The patient was instructed to call the office immediately if the following severe adverse effects occur:  hearing changes, easy bruising/bleeding, severe headache, or vision changes.  The patient verbalized understanding of the proper use and possible adverse effects of doxycycline.  All of the patient's questions and concerns were addressed. Doxepin Counseling:  Patient advised that the medication is sedating and not to drive a car after taking this medication. Patient informed of potential adverse effects including but not limited to dry mouth, urinary retention, and blurry vision.  The patient verbalized understanding of the proper use and possible adverse effects of doxepin.  All of the patient's questions and concerns were addressed. Topical Clindamycin Pregnancy And Lactation Text: This medication is Pregnancy Category B and is considered safe during pregnancy. It is unknown if it is excreted in breast milk. Detail Level: Detailed Erivedge Counseling- I discussed with the patient the risks of Erivedge including but not limited to nausea, vomiting, diarrhea, constipation, weight loss, changes in the sense of taste, decreased appetite, muscle spasms, and hair loss.  The patient verbalized understanding of the proper use and possible adverse effects of Erivedge.  All of the patient's questions and concerns were addressed. Griseofulvin Counseling:  I discussed with the patient the risks of griseofulvin including but not limited to photosensitivity, cytopenia, liver damage, nausea/vomiting and severe allergy.  The patient understands that this medication is best absorbed when taken with a fatty meal (e.g., ice cream or french fries). Enbrel Pregnancy And Lactation Text: This medication is Pregnancy Category B and is considered safe during pregnancy. It is unknown if this medication is excreted in breast milk. Hydroquinone Counseling:  Patient advised that medication may result in skin irritation, lightening (hypopigmentation), dryness, and burning.  In the event of skin irritation, the patient was advised to reduce the amount of the drug applied or use it less frequently.  Rarely, spots that are treated with hydroquinone can become darker (pseudoochronosis).  Should this occur, patient instructed to stop medication and call the office. The patient verbalized understanding of the proper use and possible adverse effects of hydroquinone.  All of the patient's questions and concerns were addressed. Colchicine Pregnancy And Lactation Text: This medication is Pregnancy Category C and isn't considered safe during pregnancy. It is excreted in breast milk. Xolair Counseling:  Patient informed of potential adverse effects including but not limited to fever, muscle aches, rash and allergic reactions.  The patient verbalized understanding of the proper use and possible adverse effects of Xolair.  All of the patient's questions and concerns were addressed. Minocycline Counseling: Patient advised regarding possible photosensitivity and discoloration of the teeth, skin, lips, tongue and gums.  Patient instructed to avoid sunlight, if possible.  When exposed to sunlight, patients should wear protective clothing, sunglasses, and sunscreen.  The patient was instructed to call the office immediately if the following severe adverse effects occur:  hearing changes, easy bruising/bleeding, severe headache, or vision changes.  The patient verbalized understanding of the proper use and possible adverse effects of minocycline.  All of the patient's questions and concerns were addressed. Odomzo Pregnancy And Lactation Text: This medication is Pregnancy Category X and is absolutely contraindicated during pregnancy. It is unknown if it is excreted in breast milk. Isotretinoin Counseling: Patient should get monthly blood tests, not donate blood, not drive at night if vision affected, not share medication, and not undergo elective surgery for 6 months after tx completed. Side effects reviewed, pt to contact office should one occur. Solaraze Pregnancy And Lactation Text: This medication is Pregnancy Category B and is considered safe. There is some data to suggest avoiding during the third trimester. It is unknown if this medication is excreted in breast milk. Methotrexate Counseling:  Patient counseled regarding adverse effects of methotrexate including but not limited to nausea, vomiting, abnormalities in liver function tests. Patients may develop mouth sores, rash, diarrhea, and abnormalities in blood counts. The patient understands that monitoring is required including LFT's and blood counts.  There is a rare possibility of scarring of the liver and lung problems that can occur when taking methotrexate. Persistent nausea, loss of appetite, pale stools, dark urine, cough, and shortness of breath should be reported immediately. Patient advised to discontinue methotrexate treatment at least three months before attempting to become pregnant.  I discussed the need for folate supplements while taking methotrexate.  These supplements can decrease side effects during methotrexate treatment. The patient verbalized understanding of the proper use and possible adverse effects of methotrexate.  All of the patient's questions and concerns were addressed. Bactrim Pregnancy And Lactation Text: This medication is Pregnancy Category D and is known to cause fetal risk.  It is also excreted in breast milk. Thalidomide Counseling: I discussed with the patient the risks of thalidomide including but not limited to birth defects, anxiety, weakness, chest pain, dizziness, cough and severe allergy. Drysol Counseling:  I discussed with the patient the risks of drysol/aluminum chloride including but not limited to skin rash, itching, irritation, burning. Tetracycline Counseling: Patient counseled regarding possible photosensitivity and increased risk for sunburn.  Patient instructed to avoid sunlight, if possible.  When exposed to sunlight, patients should wear protective clothing, sunglasses, and sunscreen.  The patient was instructed to call the office immediately if the following severe adverse effects occur:  hearing changes, easy bruising/bleeding, severe headache, or vision changes.  The patient verbalized understanding of the proper use and possible adverse effects of tetracycline.  All of the patient's questions and concerns were addressed. Patient understands to avoid pregnancy while on therapy due to potential birth defects. Albendazole Pregnancy And Lactation Text: This medication is Pregnancy Category C and it isn't known if it is safe during pregnancy. It is also excreted in breast milk. Taltz Counseling: I discussed with the patient the risks of ixekizumab including but not limited to immunosuppression, serious infections, worsening of inflammatory bowel disease and drug reactions.  The patient understands that monitoring is required including a PPD at baseline and must alert us or the primary physician if symptoms of infection or other concerning signs are noted. Minoxidil Pregnancy And Lactation Text: This medication has not been assigned a Pregnancy Risk Category but animal studies failed to show danger with the topical medication. It is unknown if the medication is excreted in breast milk. Glycopyrrolate Pregnancy And Lactation Text: This medication is Pregnancy Category B and is considered safe during pregnancy. It is unknown if it is excreted breast milk. Infliximab Counseling:  I discussed with the patient the risks of infliximab including but not limited to myelosuppression, immunosuppression, autoimmune hepatitis, demyelinating diseases, lymphoma, and serious infections.  The patient understands that monitoring is required including a PPD at baseline and must alert us or the primary physician if symptoms of infection or other concerning signs are noted. Doxycycline Pregnancy And Lactation Text: This medication is Pregnancy Category D and not consider safe during pregnancy. It is also excreted in breast milk but is considered safe for shorter treatment courses. Terbinafine Counseling: Patient counseling regarding adverse effects of terbinafine including but not limited to headache, diarrhea, rash, upset stomach, liver function test abnormalities, itching, taste/smell disturbance, nausea, abdominal pain, and flatulence.  There is a rare possibility of liver failure that can occur when taking terbinafine.  The patient understands that a baseline LFT and kidney function test may be required. The patient verbalized understanding of the proper use and possible adverse effects of terbinafine.  All of the patient's questions and concerns were addressed. Cellcept Counseling:  I discussed with the patient the risks of mycophenolate mofetil including but not limited to infection/immunosuppression, GI upset, hypokalemia, hypercholesterolemia, bone marrow suppression, lymphoproliferative disorders, malignancy, GI ulceration/bleed/perforation, colitis, interstitial lung disease, kidney failure, progressive multifocal leukoencephalopathy, and birth defects.  The patient understands that monitoring is required including a baseline creatinine and regular CBC testing. In addition, patient must alert us immediately if symptoms of infection or other concerning signs are noted. Arava Counseling:  Patient counseled regarding adverse effects of Arava including but not limited to nausea, vomiting, abnormalities in liver function tests. Patients may develop mouth sores, rash, diarrhea, and abnormalities in blood counts. The patient understands that monitoring is required including LFTs and blood counts.  There is a rare possibility of scarring of the liver and lung problems that can occur when taking methotrexate. Persistent nausea, loss of appetite, pale stools, dark urine, cough, and shortness of breath should be reported immediately. Patient advised to discontinue Arava treatment and consult with a physician prior to attempting conception. The patient will have to undergo a treatment to eliminate Arava from the body prior to conception. Birth Control Pills Counseling: Birth Control Pill Counseling: I discussed with the patient the potential side effects of OCPs including but not limited to increased risk of stroke, heart attack, thrombophlebitis, deep venous thrombosis, hepatic adenomas, breast changes, GI upset, headaches, and depression.  The patient verbalized understanding of the proper use and possible adverse effects of OCPs. All of the patient's questions and concerns were addressed. Cimzia Counseling:  I discussed with the patient the risks of Cimzia including but not limited to immunosuppression, allergic reactions and infections.  The patient understands that monitoring is required including a PPD at baseline and must alert us or the primary physician if symptoms of infection or other concerning signs are noted. Siliq Counseling:  I discussed with the patient the risks of Siliq including but not limited to new or worsening depression, suicidal thoughts and behavior, immunosuppression, malignancy, posterior leukoencephalopathy syndrome, and serious infections.  The patient understands that monitoring is required including a PPD at baseline and must alert us or the primary physician if symptoms of infection or other concerning signs are noted. There is also a special program designed to monitor depression which is required with Siliq. Use Enhanced Medication Counseling?: No Doxepin Pregnancy And Lactation Text: This medication is Pregnancy Category C and it isn't known if it is safe during pregnancy. It is also excreted in breast milk and breast feeding isn't recommended. Topical Clindamycin Counseling: Patient counseled that this medication may cause skin irritation or allergic reactions.  In the event of skin irritation, the patient was advised to reduce the amount of the drug applied or use it less frequently.   The patient verbalized understanding of the proper use and possible adverse effects of clindamycin.  All of the patient's questions and concerns were addressed. Xelkarleez Pregnancy And Lactation Text: This medication is Pregnancy Category D and is not considered safe during pregnancy.  The risk during breast feeding is also uncertain. Odomzo Counseling- I discussed with the patient the risks of Odomzo including but not limited to nausea, vomiting, diarrhea, constipation, weight loss, changes in the sense of taste, decreased appetite, muscle spasms, and hair loss.  The patient verbalized understanding of the proper use and possible adverse effects of Odomzo.  All of the patient's questions and concerns were addressed. Griseofulvin Pregnancy And Lactation Text: This medication is Pregnancy Category X and is known to cause serious birth defects. It is unknown if this medication is excreted in breast milk but breast feeding should be avoided. Bexarotene Pregnancy And Lactation Text: This medication is Pregnancy Category X and should not be given to women who are pregnant or may become pregnant. This medication should not be used if you are breast feeding. Colchicine Counseling:  Patient counseled regarding adverse effects including but not limited to stomach upset (nausea, vomiting, stomach pain, or diarrhea).  Patient instructed to limit alcohol consumption while taking this medication.  Colchicine may reduce blood counts especially with prolonged use.  The patient understands that monitoring of kidney function and blood counts may be required, especially at baseline. The patient verbalized understanding of the proper use and possible adverse effects of colchicine.  All of the patient's questions and concerns were addressed. Enbrel Counseling:  I discussed with the patient the risks of etanercept including but not limited to myelosuppression, immunosuppression, autoimmune hepatitis, demyelinating diseases, lymphoma, and infections.  The patient understands that monitoring is required including a PPD at baseline and must alert us or the primary physician if symptoms of infection or other concerning signs are noted. Cyclosporine Pregnancy And Lactation Text: This medication is Pregnancy Category C and it isn't know if it is safe during pregnancy. This medication is excreted in breast milk. Sski Pregnancy And Lactation Text: This medication is Pregnancy Category D and isn't considered safe during pregnancy. It is excreted in breast milk. Minocycline Pregnancy And Lactation Text: This medication is Pregnancy Category D and not consider safe during pregnancy. It is also excreted in breast milk. Solaraze Counseling:  I discussed with the patient the risks of Solaraze including but not limited to erythema, scaling, itching, weeping, crusting, and pain. Terbinafine Pregnancy And Lactation Text: This medication is Pregnancy Category B and is considered safe during pregnancy. It is also excreted in breast milk and breast feeding isn't recommended. Cephalexin Counseling: I counseled the patient regarding use of cephalexin as an antibiotic for prophylactic and/or therapeutic purposes. Cephalexin (commonly prescribed under brand name Keflex) is a cephalosporin antibiotic which is active against numerous classes of bacteria, including most skin bacteria. Side effects may include nausea, diarrhea, gastrointestinal upset, rash, hives, yeast infections, and in rare cases, hepatitis, kidney disease, seizures, fever, confusion, neurologic symptoms, and others. Patients with severe allergies to penicillin medications are cautioned that there is about a 10% incidence of cross-reactivity with cephalosporins. When possible, patients with penicillin allergies should use alternatives to cephalosporins for antibiotic therapy. Zyclara Pregnancy And Lactation Text: This medication is Pregnancy Category C. It is unknown if this medication is excreted in breast milk. Ivermectin Counseling:  Patient instructed to take medication on an empty stomach with a full glass of water.  Patient informed of potential adverse effects including but not limited to nausea, diarrhea, dizziness, itching, and swelling of the extremities or lymph nodes.  The patient verbalized understanding of the proper use and possible adverse effects of ivermectin.  All of the patient's questions and concerns were addressed. Azathioprine Pregnancy And Lactation Text: This medication is Pregnancy Category D and isn't considered safe during pregnancy. It is unknown if this medication is excreted in breast milk. 5-Fu Pregnancy And Lactation Text: This medication is Pregnancy Category X and contraindicated in pregnancy and in women who may become pregnant. It is unknown if this medication is excreted in breast milk. Cimzia Pregnancy And Lactation Text: This medication crosses the placenta but can be considered safe in certain situations. Cimzia may be excreted in breast milk. Minoxidil Counseling: Minoxidil is a topical medication which can increase blood flow where it is applied. It is uncertain how this medication increases hair growth. Side effects are uncommon and include stinging and allergic reactions. Glycopyrrolate Counseling:  I discussed with the patient the risks of glycopyrrolate including but not limited to skin rash, drowsiness, dry mouth, difficulty urinating, and blurred vision. Acitretin Counseling:  I discussed with the patient the risks of acitretin including but not limited to hair loss, dry lips/skin/eyes, liver damage, hyperlipidemia, depression/suicidal ideation, photosensitivity.  Serious rare side effects can include but are not limited to pancreatitis, pseudotumor cerebri, bony changes, clot formation/stroke/heart attack.  Patient understands that alcohol is contraindicated since it can result in liver toxicity and significantly prolong the elimination of the drug by many years. Ilumya Pregnancy And Lactation Text: The risk during pregnancy and breastfeeding is uncertain with this medication. Rituxan Pregnancy And Lactation Text: This medication is Pregnancy Category C and it isn't know if it is safe during pregnancy. It is unknown if this medication is excreted in breast milk but similar antibodies are known to be excreted. Erythromycin Counseling:  I discussed with the patient the risks of erythromycin including but not limited to GI upset, allergic reaction, drug rash, diarrhea, increase in liver enzymes, and yeast infections. Tazorac Pregnancy And Lactation Text: This medication is not safe during pregnancy. It is unknown if this medication is excreted in breast milk. Benzoyl Peroxide Counseling: Patient counseled that medicine may cause skin irritation and bleach clothing.  In the event of skin irritation, the patient was advised to reduce the amount of the drug applied or use it less frequently.   The patient verbalized understanding of the proper use and possible adverse effects of benzoyl peroxide.  All of the patient's questions and concerns were addressed. Itraconazole Counseling:  I discussed with the patient the risks of itraconazole including but not limited to liver damage, nausea/vomiting, neuropathy, and severe allergy.  The patient understands that this medication is best absorbed when taken with acidic beverages such as non-diet cola or ginger ale.  The patient understands that monitoring is required including baseline LFTs and repeat LFTs at intervals.  The patient understands that they are to contact us or the primary physician if concerning signs are noted. Azithromycin Counseling:  I discussed with the patient the risks of azithromycin including but not limited to GI upset, allergic reaction, drug rash, diarrhea, and yeast infections. Hydroxyzine Counseling: Patient advised that the medication is sedating and not to drive a car after taking this medication.  Patient informed of potential adverse effects including but not limited to dry mouth, urinary retention, and blurry vision.  The patient verbalized understanding of the proper use and possible adverse effects of hydroxyzine.  All of the patient's questions and concerns were addressed. Eucrisa Counseling: Patient may experience a mild burning sensation during topical application. Eucrisa is not approved in children less than 2 years of age. Xeljanz Counseling: I discussed with the patient the risks of Xeljanz therapy including increased risk of infection, liver issues, headache, diarrhea, or cold symptoms. Live vaccines should be avoided. They were instructed to call if they have any problems. Quinolones Counseling:  I discussed with the patient the risks of fluoroquinolones including but not limited to GI upset, allergic reaction, drug rash, diarrhea, dizziness, photosensitivity, yeast infections, liver function test abnormalities, tendonitis/tendon rupture. Nsaids Pregnancy And Lactation Text: These medications are considered safe up to 30 weeks gestation. It is excreted in breast milk. Protopic Pregnancy And Lactation Text: This medication is Pregnancy Category C. It is unknown if this medication is excreted in breast milk when applied topically. Bexarotene Counseling:  I discussed with the patient the risks of bexarotene including but not limited to hair loss, dry lips/skin/eyes, liver abnormalities, hyperlipidemia, pancreatitis, depression/suicidal ideation, photosensitivity, drug rash/allergic reactions, hypothyroidism, anemia, leukopenia, infection, cataracts, and teratogenicity.  Patient understands that they will need regular blood tests to check lipid profile, liver function tests, white blood cell count, thyroid function tests and pregnancy test if applicable. Cyclosporine Counseling:  I discussed with the patient the risks of cyclosporine including but not limited to hypertension, gingival hyperplasia,myelosuppression, immunosuppression, liver damage, kidney damage, neurotoxicity, lymphoma, and serious infections. The patient understands that monitoring is required including baseline blood pressure, CBC, CMP, lipid panel and uric acid, and then 1-2 times monthly CMP and blood pressure. Cephalexin Pregnancy And Lactation Text: This medication is Pregnancy Category B and considered safe during pregnancy.  It is also excreted in breast milk but can be used safely for shorter doses. Dupixent Pregnancy And Lactation Text: This medication likely crosses the placenta but the risk for the fetus is uncertain. This medication is excreted in breast milk. SSKI Counseling:  I discussed with the patient the risks of SSKI including but not limited to thyroid abnormalities, metallic taste, GI upset, fever, headache, acne, arthralgias, paraesthesias, lymphadenopathy, easy bleeding, arrhythmias, and allergic reaction. 5-Fu Counseling: 5-Fluorouracil Counseling:  I discussed with the patient the risks of 5-fluorouracil including but not limited to erythema, scaling, itching, weeping, crusting, and pain. Ilumya Counseling: I discussed with the patient the risks of tildrakizumab including but not limited to immunosuppression, malignancy, posterior leukoencephalopathy syndrome, and serious infections.  The patient understands that monitoring is required including a PPD at baseline and must alert us or the primary physician if symptoms of infection or other concerning signs are noted. Stelara Counseling:  I discussed with the patient the risks of ustekinumab including but not limited to immunosuppression, malignancy, posterior leukoencephalopathy syndrome, and serious infections.  The patient understands that monitoring is required including a PPD at baseline and must alert us or the primary physician if symptoms of infection or other concerning signs are noted. Fluconazole Counseling:  Patient counseled regarding adverse effects of fluconazole including but not limited to headache, diarrhea, nausea, upset stomach, liver function test abnormalities, taste disturbance, and stomach pain.  There is a rare possibility of liver failure that can occur when taking fluconazole.  The patient understands that monitoring of LFTs and kidney function test may be required, especially at baseline. The patient verbalized understanding of the proper use and possible adverse effects of fluconazole.  All of the patient's questions and concerns were addressed. Zyclara Counseling:  I discussed with the patient the risks of imiquimod including but not limited to erythema, scaling, itching, weeping, crusting, and pain.  Patient understands that the inflammatory response to imiquimod is variable from person to person and was educated regarded proper titration schedule.  If flu-like symptoms develop, patient knows to discontinue the medication and contact us. High Dose Vitamin A Pregnancy And Lactation Text: High dose vitamin A therapy is contraindicated during pregnancy and breast feeding. Erythromycin Pregnancy And Lactation Text: This medication is Pregnancy Category B and is considered safe during pregnancy. It is also excreted in breast milk. Oxybutynin Counseling:  I discussed with the patient the risks of oxybutynin including but not limited to skin rash, drowsiness, dry mouth, difficulty urinating, and blurred vision. Cosentyx Counseling:  I discussed with the patient the risks of Cosentyx including but not limited to worsening of Crohn's disease, immunosuppression, allergic reactions and infections.  The patient understands that monitoring is required including a PPD at baseline and must alert us or the primary physician if symptoms of infection or other concerning signs are noted. Valtrex Pregnancy And Lactation Text: this medication is Pregnancy Category B and is considered safe during pregnancy. This medication is not directly found in breast milk but it's metabolite acyclovir is present. Hydroxyzine Pregnancy And Lactation Text: This medication is not safe during pregnancy and should not be taken. It is also excreted in breast milk and breast feeding isn't recommended. Acitretin Pregnancy And Lactation Text: This medication is Pregnancy Category X and should not be given to women who are pregnant or may become pregnant in the future. This medication is excreted in breast milk. Tazorac Counseling:  Patient advised that medication is irritating and drying.  Patient may need to apply sparingly and wash off after an hour before eventually leaving it on overnight.  The patient verbalized understanding of the proper use and possible adverse effects of tazorac.  All of the patient's questions and concerns were addressed. Nsaids Counseling: NSAID Counseling: I discussed with the patient that NSAIDs should be taken with food. Prolonged use of NSAIDs can result in the development of stomach ulcers.  Patient advised to stop taking NSAIDs if abdominal pain occurs.  The patient verbalized understanding of the proper use and possible adverse effects of NSAIDs.  All of the patient's questions and concerns were addressed. Itraconazole Pregnancy And Lactation Text: This medication is Pregnancy Category C and it isn't know if it is safe during pregnancy. It is also excreted in breast milk. Dupixent Counseling: I discussed with the patient the risks of dupilumab including but not limited to eye infection and irritation, cold sores, injection site reactions, worsening of asthma, allergic reactions and increased risk of parasitic infection.  Live vaccines should be avoided while taking dupilumab. Dupilumab will also interact with certain medications such as warfarin and cyclosporine. The patient understands that monitoring is required and they must alert us or the primary physician if symptoms of infection or other concerning signs are noted. Cyclophosphamide Pregnancy And Lactation Text: This medication is Pregnancy Category D and it isn't considered safe during pregnancy. This medication is excreted in breast milk. Clofazimine Counseling:  I discussed with the patient the risks of clofazimine including but not limited to skin and eye pigmentation, liver damage, nausea/vomiting, gastrointestinal bleeding and allergy. Spironolactone Pregnancy And Lactation Text: This medication can cause feminization of the male fetus and should be avoided during pregnancy. The active metabolite is also found in breast milk. Protopic Counseling: Patient may experience a mild burning sensation during topical application. Protopic is not approved in children less than 2 years of age. There have been case reports of hematologic and skin malignancies in patients using topical calcineurin inhibitors although causality is questionable. Azathioprine Counseling:  I discussed with the patient the risks of azathioprine including but not limited to myelosuppression, immunosuppression, hepatotoxicity, lymphoma, and infections.  The patient understands that monitoring is required including baseline LFTs, Creatinine, possible TPMP genotyping and weekly CBCs for the first month and then every 2 weeks thereafter.  The patient verbalized understanding of the proper use and possible adverse effects of azathioprine.  All of the patient's questions and concerns were addressed. Clindamycin Counseling: I counseled the patient regarding use of clindamycin as an antibiotic for prophylactic and/or therapeutic purposes. Clindamycin is active against numerous classes of bacteria, including skin bacteria. Side effects may include nausea, diarrhea, gastrointestinal upset, rash, hives, yeast infections, and in rare cases, colitis. Cimetidine Counseling:  I discussed with the patient the risks of Cimetidine including but not limited to gynecomastia, headache, diarrhea, nausea, drowsiness, arrhythmias, pancreatitis, skin rashes, psychosis, bone marrow suppression and kidney toxicity. Topical Sulfur Applications Pregnancy And Lactation Text: This medication is Pregnancy Category C and has an unknown safety profile during pregnancy. It is unknown if this topical medication is excreted in breast milk. Otezla Pregnancy And Lactation Text: This medication is Pregnancy Category C and it isn't known if it is safe during pregnancy. It is unknown if it is excreted in breast milk. Imiquimod Counseling:  I discussed with the patient the risks of imiquimod including but not limited to erythema, scaling, itching, weeping, crusting, and pain.  Patient understands that the inflammatory response to imiquimod is variable from person to person and was educated regarded proper titration schedule.  If flu-like symptoms develop, patient knows to discontinue the medication and contact us. Gabapentin Counseling: I discussed with the patient the risks of gabapentin including but not limited to dizziness, somnolence, fatigue and ataxia. Dapsone Pregnancy And Lactation Text: This medication is Pregnancy Category C and is not considered safe during pregnancy or breast feeding. Metronidazole Counseling:  I discussed with the patient the risks of metronidazole including but not limited to seizures, nausea/vomiting, a metallic taste in the mouth, nausea/vomiting and severe allergy. High Dose Vitamin A Counseling: Side effects reviewed, pt to contact office should one occur. Prednisone Counseling:  I discussed with the patient the risks of prolonged use of prednisone including but not limited to weight gain, insomnia, osteoporosis, mood changes, diabetes, susceptibility to infection, glaucoma and high blood pressure.  In cases where prednisone use is prolonged, patients should be monitored with blood pressure checks, serum glucose levels and an eye exam.  Additionally, the patient may need to be placed on GI prophylaxis, PCP prophylaxis, and calcium and vitamin D supplementation and/or a bisphosphonate.  The patient verbalized understanding of the proper use and the possible adverse effects of prednisone.  All of the patient's questions and concerns were addressed. Azithromycin Pregnancy And Lactation Text: This medication is considered safe during pregnancy and is also secreted in breast milk. Valtrex Counseling: I discussed with the patient the risks of valacyclovir including but not limited to kidney damage, nausea, vomiting and severe allergy.  The patient understands that if the infection seems to be worsening or is not improving, they are to call. Elidel Counseling: Patient may experience a mild burning sensation during topical application. Elidel is not approved in children less than 2 years of age. There have been case reports of hematologic and skin malignancies in patients using topical calcineurin inhibitors although causality is questionable. Tremfya Counseling: I discussed with the patient the risks of guselkumab including but not limited to immunosuppression, serious infections, worsening of inflammatory bowel disease and drug reactions.  The patient understands that monitoring is required including a PPD at baseline and must alert us or the primary physician if symptoms of infection or other concerning signs are noted. Rifampin Counseling: I discussed with the patient the risks of rifampin including but not limited to liver damage, kidney damage, red-orange body fluids, nausea/vomiting and severe allergy. Rituxan Counseling:  I discussed with the patient the risks of Rituxan infusions. Side effects can include infusion reactions, severe drug rashes including mucocutaneous reactions, reactivation of latent hepatitis and other infections and rarely progressive multifocal leukoencephalopathy.  All of the patient's questions and concerns were addressed. Hydroxychloroquine Pregnancy And Lactation Text: This medication has been shown to cause fetal harm but it isn't assigned a Pregnancy Risk Category. There are small amounts excreted in breast milk. Cyclophosphamide Counseling:  I discussed with the patient the risks of cyclophosphamide including but not limited to hair loss, hormonal abnormalities, decreased fertility, abdominal pain, diarrhea, nausea and vomiting, bone marrow suppression and infection. The patient understands that monitoring is required while taking this medication. Ketoconazole Counseling:   Patient counseled regarding improving absorption with orange juice.  Adverse effects include but are not limited to breast enlargement, headache, diarrhea, nausea, upset stomach, liver function test abnormalities, taste disturbance, and stomach pain.  There is a rare possibility of liver failure that can occur when taking ketoconazole. The patient understands that monitoring of LFTs may be required, especially at baseline. The patient verbalized understanding of the proper use and possible adverse effects of ketoconazole.  All of the patient's questions and concerns were addressed. Clindamycin Pregnancy And Lactation Text: This medication can be used in pregnancy if certain situations. Clindamycin is also present in breast milk. Carac Counseling:  I discussed with the patient the risks of Carac including but not limited to erythema, scaling, itching, weeping, crusting, and pain. Spironolactone Counseling: Patient advised regarding risks of diarrhea, abdominal pain, hyperkalemia, birth defects (for female patients), liver toxicity and renal toxicity. The patient may need blood work to monitor liver and kidney function and potassium levels while on therapy. The patient verbalized understanding of the proper use and possible adverse effects of spironolactone.  All of the patient's questions and concerns were addressed. Simponi Counseling:  I discussed with the patient the risks of golimumab including but not limited to myelosuppression, immunosuppression, autoimmune hepatitis, demyelinating diseases, lymphoma, and serious infections.  The patient understands that monitoring is required including a PPD at baseline and must alert us or the primary physician if symptoms of infection or other concerning signs are noted. Xolair Pregnancy And Lactation Text: This medication is Pregnancy Category B and is considered safe during pregnancy. This medication is excreted in breast milk. Topical Sulfur Applications Counseling: Topical Sulfur Counseling: Patient counseled that this medication may cause skin irritation or allergic reactions.  In the event of skin irritation, the patient was advised to reduce the amount of the drug applied or use it less frequently.   The patient verbalized understanding of the proper use and possible adverse effects of topical sulfur application.  All of the patient's questions and concerns were addressed. Isotretinoin Pregnancy And Lactation Text: This medication is Pregnancy Category X and is considered extremely dangerous during pregnancy. It is unknown if it is excreted in breast milk. Metronidazole Pregnancy And Lactation Text: This medication is Pregnancy Category B and considered safe during pregnancy.  It is also excreted in breast milk. Otezla Counseling: The side effects of Otezla were discussed with the patient, including but not limited to worsening or new depression, weight loss, diarrhea, nausea, upper respiratory tract infection, and headache. Patient instructed to call the office should any adverse effect occur.  The patient verbalized understanding of the proper use and possible adverse effects of Otezla.  All the patient's questions and concerns were addressed. Methotrexate Pregnancy And Lactation Text: This medication is Pregnancy Category X and is known to cause fetal harm. This medication is excreted in breast milk. Humira Counseling:  I discussed with the patient the risks of adalimumab including but not limited to myelosuppression, immunosuppression, autoimmune hepatitis, demyelinating diseases, lymphoma, and serious infections.  The patient understands that monitoring is required including a PPD at baseline and must alert us or the primary physician if symptoms of infection or other concerning signs are noted. Dapsone Counseling: I discussed with the patient the risks of dapsone including but not limited to hemolytic anemia, agranulocytosis, rashes, methemoglobinemia, kidney failure, peripheral neuropathy, headaches, GI upset, and liver toxicity.  Patients who start dapsone require monitoring including baseline LFTs and weekly CBCs for the first month, then every month thereafter.  The patient verbalized understanding of the proper use and possible adverse effects of dapsone.  All of the patient's questions and concerns were addressed. Bactrim Counseling:  I discussed with the patient the risks of sulfa antibiotics including but not limited to GI upset, allergic reaction, drug rash, diarrhea, dizziness, photosensitivity, and yeast infections.  Rarely, more serious reactions can occur including but not limited to aplastic anemia, agranulocytosis, methemoglobinemia, blood dyscrasias, liver or kidney failure, lung infiltrates or desquamative/blistering drug rashes. Drysol Pregnancy And Lactation Text: This medication is considered safe during pregnancy and breast feeding. Topical Retinoid counseling:  Patient advised to apply a pea-sized amount only at bedtime and wait 30 minutes after washing their face before applying.  If too drying, patient may add a non-comedogenic moisturizer. The patient verbalized understanding of the proper use and possible adverse effects of retinoids.  All of the patient's questions and concerns were addressed. Hydroxychloroquine Counseling:  I discussed with the patient that a baseline ophthalmologic exam is needed at the start of therapy and every year thereafter while on therapy. A CBC may also be warranted for monitoring.  The side effects of this medication were discussed with the patient, including but not limited to agranulocytosis, aplastic anemia, seizures, rashes, retinopathy, and liver toxicity. Patient instructed to call the office should any adverse effect occur.  The patient verbalized understanding of the proper use and possible adverse effects of Plaquenil.  All the patient's questions and concerns were addressed. Ketoconazole Pregnancy And Lactation Text: This medication is Pregnancy Category C and it isn't know if it is safe during pregnancy. It is also excreted in breast milk and breast feeding isn't recommended. Birth Control Pills Pregnancy And Lactation Text: This medication should be avoided if pregnant and for the first 30 days post-partum. Albendazole Counseling:  I discussed with the patient the risks of albendazole including but not limited to cytopenia, kidney damage, nausea/vomiting and severe allergy.  The patient understands that this medication is being used in an off-label manner. Rifampin Pregnancy And Lactation Text: This medication is Pregnancy Category C and it isn't know if it is safe during pregnancy. It is also excreted in breast milk and should not be used if you are breast feeding. Picato Counseling:  I discussed with the patient the risks of Picato including but not limited to erythema, scaling, itching, weeping, crusting, and pain.

## 2024-01-30 NOTE — ED PROVIDER NOTE - CLINICAL SUMMARY MEDICAL DECISION MAKING FREE TEXT BOX
A/P: 33-year-old male with past medical history of HIV presenting to the emergency room for potential STI  -- Patient engaging in high risk sexual behavior, has a history of STIs in the past, is suitable for prophylactic treatment with penicillin, ceftriaxone, doxycycline  -- Safe sex practices encouraged

## 2024-01-30 NOTE — ED PROVIDER NOTE - PHYSICAL EXAMINATION
Constitutional:  Well appearing, awake, alert, oriented  and in no apparent distress.  HEENT: Airway patent, Nasal mucosa clear. Mouth with normal mucosa.   Eyes: Clear bilaterally, pupils equal, round and reactive to light.  Cardiac: Normal rate, regular rhythm.  Respiratory: Breath sounds clear and equal bilaterally.  GI: Abdomen soft, non-tender, no guarding.  MSK: Spine appears normal, range of motion is not limited, no muscle or joint tenderness  Neuro: Alert and oriented, no focal deficits, no motor or sensory deficits.  Skin: Skin normal color for race, warm, dry and intact. No evidence of rash.  Rectal: Normal exam, no abscess (chaperone was nurse Oswaldo Lay)

## 2024-01-30 NOTE — ED ADULT NURSE NOTE - NSSUHOSCREENINGYN_ED_ALL_ED
Yes - the patient is able to be screened Repair Performed By Another Provider Text (Leave Blank If You Do Not Want): After the tissue was excised the defect was repaired by another provider.

## 2024-01-30 NOTE — ED ADULT NURSE NOTE - NSFALLUNIVINTERV_ED_ALL_ED
Bed/Stretcher in lowest position, wheels locked, appropriate side rails in place/Call bell, personal items and telephone in reach/Instruct patient to call for assistance before getting out of bed/chair/stretcher/Non-slip footwear applied when patient is off stretcher/[place] to call system/Physically safe environment - no spills, clutter or unnecessary equipment/Purposeful proactive rounding/Room/bathroom lighting operational, light cord in reach

## 2024-01-30 NOTE — ED PROVIDER NOTE - OBJECTIVE STATEMENT
33-year-old male with past medical history of HIV on Biktarvy (unknown viral load or CD4 count) presented to the emergency room for dysuria, anal discomfort.  Patient believes he was infected with gonorrhea by his male partner, states that he practices receptive anal intercourse without condoms.  States that he has had gonorrhea and syphilis in the past.  States that it feels similarly to previous infections.  States that he saw pus around his anus when he was wiping.  No fevers.

## 2024-01-31 LAB
RPR SER-TITR: (no result)
RPR SERPL-ACNC: REACTIVE
T PALLIDUM AB TITR SER: POSITIVE

## 2024-02-23 NOTE — ED ADULT TRIAGE NOTE - TEMPERATURE IN FAHRENHEIT (DEGREES F)
Post-Care Instructions: I reviewed with the patient in detail post-care instructions. Patient is to wear sunprotection, and avoid picking at any of the treated lesions. Pt may apply Vaseline to crusted or scabbing areas. Render Note In Bullet Format When Appropriate: No Show Applicator Variable?: Yes Duration Of Freeze Thaw-Cycle (Seconds): 5 Consent: The patient's consent was obtained including but not limited to risks of crusting, scabbing, blistering, scarring, darker or lighter pigmentary change, recurrence, incomplete removal and infection. Detail Level: Detailed Number Of Freeze-Thaw Cycles: 3 freeze-thaw cycles 97.7

## 2024-09-16 NOTE — ED PROVIDER NOTE - BIRTH SEX
Patients Xarelto Medication Calculation    Creatine- 0.95    Age- 75    Weight- 86.2 kg    Gender- Male     Total- 83    
Male